# Patient Record
Sex: FEMALE | Race: WHITE | NOT HISPANIC OR LATINO | Employment: OTHER | URBAN - METROPOLITAN AREA
[De-identification: names, ages, dates, MRNs, and addresses within clinical notes are randomized per-mention and may not be internally consistent; named-entity substitution may affect disease eponyms.]

---

## 2025-03-17 ENCOUNTER — OFFICE VISIT (OUTPATIENT)
Dept: PAIN MEDICINE | Facility: CLINIC | Age: 80
End: 2025-03-17
Payer: MEDICARE

## 2025-03-17 VITALS
SYSTOLIC BLOOD PRESSURE: 149 MMHG | DIASTOLIC BLOOD PRESSURE: 90 MMHG | RESPIRATION RATE: 15 BRPM | WEIGHT: 123 LBS | HEART RATE: 73 BPM | BODY MASS INDEX: 24.8 KG/M2 | TEMPERATURE: 96.4 F | HEIGHT: 59 IN | OXYGEN SATURATION: 99 %

## 2025-03-17 DIAGNOSIS — M51.26 LUMBAR DISC HERNIATION: Primary | ICD-10-CM

## 2025-03-17 DIAGNOSIS — M54.42 CHRONIC LEFT-SIDED LOW BACK PAIN WITH LEFT-SIDED SCIATICA: ICD-10-CM

## 2025-03-17 DIAGNOSIS — G89.29 CHRONIC LEFT-SIDED LOW BACK PAIN WITH LEFT-SIDED SCIATICA: ICD-10-CM

## 2025-03-17 DIAGNOSIS — M54.16 LUMBAR NEURITIS: ICD-10-CM

## 2025-03-17 DIAGNOSIS — M48.061 LUMBAR FORAMINAL STENOSIS: ICD-10-CM

## 2025-03-17 PROCEDURE — 99214 OFFICE O/P EST MOD 30 MIN: CPT | Performed by: ANESTHESIOLOGY

## 2025-03-17 RX ORDER — RALOXIFENE HYDROCHLORIDE 60 MG/1
60 TABLET, FILM COATED ORAL DAILY
COMMUNITY

## 2025-03-17 RX ORDER — CALCIUM CITRATE/VITAMIN D3 315MG-6.25
1 TABLET ORAL
COMMUNITY

## 2025-03-17 RX ORDER — BISMUTH SUBSALICYLATE 262 MG
1 TABLET,CHEWABLE ORAL DAILY
COMMUNITY

## 2025-03-17 RX ORDER — PRAVASTATIN SODIUM 20 MG/1
20 TABLET ORAL
COMMUNITY
Start: 2024-11-19

## 2025-03-17 RX ORDER — FOLIC ACID-PYRIDOXINE-CYANOCOBALAMIN TAB 2.5-25-2 MG 2.5-25-2 MG
TAB ORAL
COMMUNITY

## 2025-03-17 ASSESSMENT — PAIN DESCRIPTION - DESCRIPTORS: DESCRIPTORS: ACHING

## 2025-03-17 ASSESSMENT — PAIN SCALES - GENERAL
PAINLEVEL_OUTOF10: 2
PAINLEVEL_OUTOF10: 2

## 2025-03-17 ASSESSMENT — COLUMBIA-SUICIDE SEVERITY RATING SCALE - C-SSRS
6. HAVE YOU EVER DONE ANYTHING, STARTED TO DO ANYTHING, OR PREPARED TO DO ANYTHING TO END YOUR LIFE?: NO
1. IN THE PAST MONTH, HAVE YOU WISHED YOU WERE DEAD OR WISHED YOU COULD GO TO SLEEP AND NOT WAKE UP?: NO
2. HAVE YOU ACTUALLY HAD ANY THOUGHTS OF KILLING YOURSELF?: NO

## 2025-03-17 ASSESSMENT — PAIN - FUNCTIONAL ASSESSMENT: PAIN_FUNCTIONAL_ASSESSMENT: 0-10

## 2025-03-17 ASSESSMENT — PATIENT HEALTH QUESTIONNAIRE - PHQ9
1. LITTLE INTEREST OR PLEASURE IN DOING THINGS: NOT AT ALL
2. FEELING DOWN, DEPRESSED OR HOPELESS: NOT AT ALL
SUM OF ALL RESPONSES TO PHQ9 QUESTIONS 1 AND 2: 0

## 2025-03-17 ASSESSMENT — ENCOUNTER SYMPTOMS
LOSS OF SENSATION IN FEET: 0
OCCASIONAL FEELINGS OF UNSTEADINESS: 0

## 2025-03-17 NOTE — PROGRESS NOTES
History Of Present Illness  Geovany Adams is a 79 y.o. female presenting with   Chief Complaint   Patient presents with    Pain       Patient presents with complaints of chronic low back pain to the Left buttock, groin, thigh into her LLE. The pain is constant, worse with activity and better with rest. The pain is sharp, stabbing and shooting to the LLE. Denies LE paresthesias, weakness, saddle anesthesia, bowel or bladder incontinence. To manage this pain the patient has attempted Prednisone dose pack in January of 2025 with several weeks of relief.  The patient has undergone bursa injections with some relief of her pain.  The patients chronic DLD are stable on medication management.     PAIN SCORE: 2-6/10.    Ref: Dr. Kahn         Past Medical History  She has no past medical history on file.    Surgical History  She has a past surgical history that includes Other surgical history (05/22/2014); Knee arthroscopy w/ debridement (05/22/2014); and Foot surgery (03/09/2017).     Social History  She reports that she has never smoked. She has never used smokeless tobacco. She reports that she does not drink alcohol and does not use drugs.    Family History  No family history on file.     Allergies  Sulfa (sulfonamide antibiotics)    Review of Systems    All other systems reviewed and negative for any deficits. Pertinent positives and negatives were considered in the medical decision making process.        Physical Exam  /90   Pulse 73   Temp 35.8 °C (96.4 °F)   Resp 15   Wt 55.8 kg (123 lb)   SpO2 99%     General: Pt appears stated age    Eyes: Conjunctiva non-icteric and lids without obvious rash or drooping. Pupils are symmetric.    ENT: External ears and nose appear to be without deformity or rash. No lesions or masses noted. Hearing is grossly intact.    Respiratory: No gasping or shortness of breath noted. No use of accessory muscles noted.    CVS: Extremities show no edema or varicosities    Skin:  No rashes or open lesions/ulcers identified on skin. No induration/tightening noted with palpation of skin.     Musculoskeletal: [place] is grossly normal.    Stability: No subluxation noted on movement of bilateral upper extremities or head/neck.     Strength: 5/5 in RLE and 5/5 in LLE     Range of Motion: WNL    Neurologic: Reflexes 2+    Sensation: WNL    Neurologic: Cranial Nerves II thru XII are grossly intact    Psychiatric: Pt is alert and oriented to time, person, and place           Assessment/Plan   1. Lumbar disc herniation        2. Lumbar neuritis        3. Lumbar foraminal stenosis        4. Chronic left-sided low back pain with left-sided sciatica             1. I have provided the patient with a list of physical therapy exercises to learn and perform to strengthen core, maintain stabilization, and reduce pain. We reviewed the exercises in detail and I encouraged them to perform them on a regular basis.    2.I would recommend the pt start on Gabapentin to help with nerve related pain. We discussed the risks, benefits, and side effects to this medication including the mechanism of action and the pt understands and agrees.     3. I extensively reviewed the patients MRI findings in detail, including review of the actual images and provided a detailed explanation of the findings using a spine model.     There is severe disc degeneration at the L4/5 level with herniation. There is no canal stenosis.     [facility] [place]  [phone]    4. The patient is a candidate for an LESI L4/5 to treat low back and radicular pain. I spent time with the patient discussing the risks, benefits, and alternatives to this measure including, but not limited to worsening pain with injection, no improvement/guarantee with the procedure, numbness in the lower extremity and risk of falls post procedure, vagal reaction/ hypotension, feeling dizzy / lightheaded, spinal infection, worsening pre-existing infections, epidural hematoma, epidural  abscess, nerve injury, paralysis, spinal fluid leak and spinal headache. The patient understands all of these risks and agrees to proceed with the planned procedure.         I spent time with the patient reviewing their imaging and discussing the risks benefits and alternatives to the above plan. A total of 45 minutes was spent reviewing the data and greater than 50% of that time was with the patient during the face to face encounter discussing treatment options both surgical, non-surgical, and minimally invasive techniques.       Sincerely,       Adriano Lomas MD, FASA  Board Certified Anesthesiologist  Board Certified Pain Management Specialist  Clinical Faculty, Department of Anesthesiology and Perioperative Medicine  [facility] [employer]     [address]  [facility] 4  [address]  [facility]   [address] [id number]    [facility]  [address] [id number]     Phone: [phone]  Fax: [phone]

## 2025-03-21 ENCOUNTER — TELEPHONE (OUTPATIENT)
Dept: INFUSION THERAPY | Facility: CLINIC | Age: 80
End: 2025-03-21
Payer: MEDICARE

## 2025-03-21 NOTE — TELEPHONE ENCOUNTER
Please advise pt we can schedule at [facility] if they are unable to accommodate. I can place orders if she is agreeable.

## 2025-03-21 NOTE — TELEPHONE ENCOUNTER
Patient spoke to Paola at [facility] and she told the patient that they are not open March 31 due to an unexpected closer. Pt is not sure exactly what is making them close.      She wants to know if you think it would be okay if she can wait alittle while fto have the procedure due to her  having surgery on the April 4?  The surgery center said they can see her on April 7, but the patient is unable to do that date because of her .      She would like to know how you would like to proceed as soon as possible.  [phone].

## 2025-03-25 ENCOUNTER — TELEPHONE (OUTPATIENT)
Dept: PAIN MEDICINE | Facility: CLINIC | Age: 80
End: 2025-03-25
Payer: MEDICARE

## 2025-05-12 ENCOUNTER — TELEPHONE (OUTPATIENT)
Dept: PAIN MEDICINE | Facility: CLINIC | Age: 80
End: 2025-05-12
Payer: MEDICARE

## 2025-06-18 ENCOUNTER — OFFICE VISIT (OUTPATIENT)
Dept: PAIN MEDICINE | Facility: CLINIC | Age: 80
End: 2025-06-18
Payer: MEDICARE

## 2025-06-18 VITALS
DIASTOLIC BLOOD PRESSURE: 71 MMHG | WEIGHT: 123 LBS | HEIGHT: 60 IN | RESPIRATION RATE: 15 BRPM | TEMPERATURE: 96.8 F | HEART RATE: 91 BPM | BODY MASS INDEX: 24.15 KG/M2 | OXYGEN SATURATION: 96 % | SYSTOLIC BLOOD PRESSURE: 126 MMHG

## 2025-06-18 DIAGNOSIS — M54.16 LUMBAR NEURITIS: ICD-10-CM

## 2025-06-18 DIAGNOSIS — M51.26 LUMBAR DISC HERNIATION: Primary | ICD-10-CM

## 2025-06-18 DIAGNOSIS — G89.29 CHRONIC LOW BACK PAIN, UNSPECIFIED BACK PAIN LATERALITY, UNSPECIFIED WHETHER SCIATICA PRESENT: ICD-10-CM

## 2025-06-18 DIAGNOSIS — M54.50 CHRONIC LOW BACK PAIN, UNSPECIFIED BACK PAIN LATERALITY, UNSPECIFIED WHETHER SCIATICA PRESENT: ICD-10-CM

## 2025-06-18 PROCEDURE — 99214 OFFICE O/P EST MOD 30 MIN: CPT | Performed by: ANESTHESIOLOGY

## 2025-06-18 ASSESSMENT — PAIN DESCRIPTION - DESCRIPTORS: DESCRIPTORS: ACHING

## 2025-06-18 ASSESSMENT — PAIN SCALES - GENERAL
PAINLEVEL_OUTOF10: 3
PAINLEVEL_OUTOF10: 3

## 2025-06-18 ASSESSMENT — ENCOUNTER SYMPTOMS
OCCASIONAL FEELINGS OF UNSTEADINESS: 0
LOSS OF SENSATION IN FEET: 0

## 2025-06-18 ASSESSMENT — PAIN - FUNCTIONAL ASSESSMENT: PAIN_FUNCTIONAL_ASSESSMENT: 0-10

## 2025-06-18 NOTE — PROGRESS NOTES
History Of Present Illness  Geovany Adams is a 79 y.o. female presenting with   Chief Complaint   Patient presents with    Follow-up       Patient follows up for IMPROVED chronic low back pain to the Left buttock, groin, thigh into her LLE. The pain is constant, worse with activity and better with rest. The pain is sharp, stabbing and shooting to the LLE. Denies LE paresthesias, weakness, saddle anesthesia, bowel or bladder incontinence. To manage this pain the patient has attempted Prednisone dose pack in January of 2025 with several weeks of relief.  The patient has undergone bursa injections with some relief of her pain.  The patients chronic DLD are stable on medication management.     PAIN SCORE: 2-3/10.was a 2-6/10     Ref: Dr. Kahn       Past Medical History  She has no past medical history on file.    Surgical History  She has a past surgical history that includes Other surgical history (05/22/2014); Knee arthroscopy w/ debridement (05/22/2014); and Foot surgery (03/09/2017).     Social History  She reports that she has never smoked. She has never used smokeless tobacco. She reports that she does not drink alcohol and does not use drugs.    Family History  No family history on file.     Allergies  Sulfa (sulfonamide antibiotics)    Review of Systems    All other systems reviewed and negative for any deficits. Pertinent positives and negatives were considered in the medical decision making process.        Physical Exam  /71   Pulse 91   Temp 36 °C (96.8 °F)   Resp 15   Wt 55.8 kg (123 lb)   SpO2 96%     General: Pt appears stated age    Eyes: Conjunctiva non-icteric and lids without obvious rash or drooping. Pupils are symmetric.    ENT: External ears and nose appear to be without deformity or rash. No lesions or masses noted. Hearing is grossly intact.    Respiratory: No gasping or shortness of breath noted. No use of accessory muscles noted.    CVS: Extremities show no edema or  varicosities    Skin: No rashes or open lesions/ulcers identified on skin. No induration/tightening noted with palpation of skin.     Musculoskeletal: [place] is grossly normal.    Stability: No subluxation noted on movement of bilateral upper extremities or head/neck.     Strength: 5/5 in RLE and 5/5 in LLE     Range of Motion: WNL    Neurologic: Reflexes 2+    Sensation: WNL    Neurologic: Cranial Nerves II thru XII are grossly intact    Psychiatric: Pt is alert and oriented to time, person, and place           Assessment/Plan   No diagnosis found.       1. I have provided the patient with a list of physical therapy exercises to learn and perform to strengthen core, maintain stabilization, and reduce pain. We reviewed the exercises in detail and I encouraged them to perform them on a regular basis.    2.I would recommend the pt start on Gabapentin to help with nerve related pain. We discussed the risks, benefits, and side effects to this medication including the mechanism of action and the pt understands and agrees.     3. I extensively reviewed the patients MRI findings in detail, including review of the actual images and provided a detailed explanation of the findings using a spine model.     There is severe disc degeneration at the L4/5 level with herniation. There is no canal stenosis.     [facility] [place]  [phone]    4. The patient is a candidate for an LESI L4/5 to treat low back and radicular pain. I spent time with the patient discussing the risks, benefits, and alternatives to this measure including, but not limited to worsening pain with injection, no improvement/guarantee with the procedure, numbness in the lower extremity and risk of falls post procedure, vagal reaction/ hypotension, feeling dizzy / lightheaded, spinal infection, worsening pre-existing infections, epidural hematoma, epidural abscess, nerve injury, paralysis, spinal fluid leak and spinal headache. The patient understands all of these risks and  agrees to proceed with the planned procedure.     [place] [facility] [place] 50% relief of her pain that is ONGOING for now.     I spent time with the patient reviewing their imaging and discussing the risks benefits and alternatives to the above plan. A total of 30 minutes was spent reviewing the data and greater than 50% of that time was with the patient during the face to face encounter discussing treatment options both surgical, non-surgical, and minimally invasive techniques.       Sincerely,       Adriano Lomas MD, FASA  Board Certified Anesthesiologist  Board Certified Pain Management Specialist  Clinical Faculty, Department of Anesthesiology and Perioperative Medicine  [facility] [employer] [facility]     [address].  [facility] [address]  [facility]   [address] [id number]    [facility]  [address] [id number]     Phone: [phone]  Fax: [phone]